# Patient Record
Sex: FEMALE | Race: WHITE | NOT HISPANIC OR LATINO | URBAN - METROPOLITAN AREA
[De-identification: names, ages, dates, MRNs, and addresses within clinical notes are randomized per-mention and may not be internally consistent; named-entity substitution may affect disease eponyms.]

---

## 2021-08-02 ENCOUNTER — WEB ENCOUNTER (OUTPATIENT)
Dept: URBAN - METROPOLITAN AREA CLINIC 126 | Facility: CLINIC | Age: 26
End: 2021-08-02

## 2021-08-02 ENCOUNTER — OFFICE VISIT (OUTPATIENT)
Dept: URBAN - METROPOLITAN AREA CLINIC 126 | Facility: CLINIC | Age: 26
End: 2021-08-02
Payer: MEDICAID

## 2021-08-02 DIAGNOSIS — K74.3 PRIMARY BILIARY CIRRHOSIS: ICD-10-CM

## 2021-08-02 PROCEDURE — 99213 OFFICE O/P EST LOW 20 MIN: CPT | Performed by: INTERNAL MEDICINE

## 2021-08-02 RX ORDER — URSODIOL 300 MG/1
TAKE 1 CAPSULE (300 MG) BY ORAL ROUTE 2 TIMES PER DAY FOR 30 DAYS CAPSULE ORAL 2
Qty: 60 | Refills: 3 | Status: ON HOLD | COMMUNITY
Start: 2017-06-21

## 2021-08-02 RX ORDER — AMITRIPTYLINE HYDROCHLORIDE 25 MG/1
TAKE 2 TABLETS (50 MG) BY ORAL ROUTE ONCE DAILY AT BEDTIME TABLET, FILM COATED ORAL 1
Qty: 0 | Refills: 0 | Status: ON HOLD | COMMUNITY
Start: 1900-01-01

## 2021-08-02 RX ORDER — HYOSCYAMINE SULFATE 0.12 MG/1
TAKE 1 TABLET BY ORAL ROUTE 3 TIMES A DAY TABLET ORAL
Qty: 90 | Refills: 6 | Status: ON HOLD | COMMUNITY
Start: 2018-01-18

## 2021-08-02 RX ORDER — URSODIOL 300 MG/1
TAKE 1 CAPSULE (300 MG) BY ORAL ROUTE 2 TIMES PER DAY FOR 30 DAYS CAPSULE ORAL 2
Qty: 180 | Refills: 6
Start: 2017-06-21

## 2021-08-02 RX ORDER — NORETHINDRONE ACETATE AND ETHINYL ESTRADIOL AND FERROUS FUMARATE 1MG-20(21)
TAKE 1 TABLET BY ORAL ROUTE ONCE DAILY KIT ORAL 1
Qty: 0 | Refills: 0 | Status: ACTIVE | COMMUNITY
Start: 1900-01-01

## 2021-08-02 NOTE — HPI-TODAY'S VISIT:
diagnosed with pbc by dr stephens 4 yrs ago  had lose stools  eval by dr milan for diarrhea  egd and colon neg  no celiac    has occ incontinence   we discussed ibs  in school and has job  no nausea or vomiting [facility] _____for _matechak____ .   A copy of this document is being forwarded to the referring provider.

## 2021-08-03 LAB
BASO (ABSOLUTE): 0.1
BASOS: 1
EOS (ABSOLUTE): 0.1
EOS: 1
HEMATOCRIT: 41.2
HEMATOLOGY COMMENTS:: (no result)
HEMOGLOBIN: 13.3
IMMATURE CELLS: (no result)
IMMATURE GRANS (ABS): 0
IMMATURE GRANULOCYTES: 0
LYMPHS (ABSOLUTE): 2.6
LYMPHS: 29
MCH: 29.2
MCHC: 32.3
MCV: 90
MONOCYTES(ABSOLUTE): 0.4
MONOCYTES: 5
NEUTROPHILS (ABSOLUTE): 5.7
NEUTROPHILS: 64
NRBC: (no result)
PLATELETS: 376
RBC: 4.56
RDW: 12.8
WBC: 9
WRITTEN AUTHORIZATION: (no result)

## 2021-08-04 LAB
ALBUMIN: 4.8
ALKALINE PHOSPHATASE: 123
ALT (SGPT): 15
AST (SGOT): 19
BILIRUBIN, DIRECT: 0.07
BILIRUBIN, TOTAL: 0.2
MITOCHONDRIAL (M2) ANTIBODY: 58.2
PROTEIN, TOTAL: 7.7

## 2021-08-18 ENCOUNTER — TELEPHONE ENCOUNTER (OUTPATIENT)
Dept: URBAN - METROPOLITAN AREA CLINIC 126 | Facility: CLINIC | Age: 26
End: 2021-08-18

## 2021-11-01 ENCOUNTER — OFFICE VISIT (OUTPATIENT)
Dept: URBAN - METROPOLITAN AREA CLINIC 126 | Facility: CLINIC | Age: 26
End: 2021-11-01
Payer: MEDICAID

## 2021-11-01 ENCOUNTER — DASHBOARD ENCOUNTERS (OUTPATIENT)
Age: 26
End: 2021-11-01

## 2021-11-01 DIAGNOSIS — K74.3 PRIMARY BILIARY CIRRHOSIS: ICD-10-CM

## 2021-11-01 PROBLEM — 31712002: Status: ACTIVE | Noted: 2021-08-02

## 2021-11-01 PROCEDURE — 99214 OFFICE O/P EST MOD 30 MIN: CPT | Performed by: INTERNAL MEDICINE

## 2021-11-01 RX ORDER — NORETHINDRONE ACETATE AND ETHINYL ESTRADIOL AND FERROUS FUMARATE 1MG-20(21)
TAKE 1 TABLET BY ORAL ROUTE ONCE DAILY KIT ORAL 1
Qty: 0 | Refills: 0 | Status: ACTIVE | COMMUNITY
Start: 1900-01-01

## 2021-11-01 RX ORDER — AMITRIPTYLINE HYDROCHLORIDE 25 MG/1
TAKE 2 TABLETS (50 MG) BY ORAL ROUTE ONCE DAILY AT BEDTIME TABLET, FILM COATED ORAL 1
Qty: 0 | Refills: 0 | Status: ON HOLD | COMMUNITY
Start: 1900-01-01

## 2021-11-01 RX ORDER — HYOSCYAMINE SULFATE 0.12 MG/1
TAKE 1 TABLET BY ORAL ROUTE 3 TIMES A DAY TABLET ORAL
Qty: 90 | Refills: 6 | Status: ON HOLD | COMMUNITY
Start: 2018-01-18

## 2021-11-01 RX ORDER — URSODIOL 300 MG/1
TAKE 1 CAPSULE (300 MG) BY ORAL ROUTE 2 TIMES PER DAY FOR 30 DAYS CAPSULE ORAL 2
Qty: 180 | Refills: 6 | Status: ACTIVE | COMMUNITY
Start: 2017-06-21

## 2021-11-02 LAB
ALBUMIN: 4.9
ALKALINE PHOSPHATASE: 154
ALT (SGPT): 22
AST (SGOT): 19
BILIRUBIN, DIRECT: <0.1
BILIRUBIN, TOTAL: <0.2
PROTEIN, TOTAL: 7.9

## 2024-12-17 NOTE — PHYSICAL EXAM GASTROINTESTINAL
Abdomen, soft, nontender, nondistended, no guarding or rigidity, no masses palpable, normal bowel sounds , Abdomen , soft, nontender, nondistended , no guarding or rigidity , no masses palpable , normal bowel sounds , Liver and Spleen , no hepatomegaly present , no hepatosplenomegaly , liver nontender , spleen not palpable
Congruent